# Patient Record
Sex: MALE | URBAN - METROPOLITAN AREA
[De-identification: names, ages, dates, MRNs, and addresses within clinical notes are randomized per-mention and may not be internally consistent; named-entity substitution may affect disease eponyms.]

---

## 2024-03-13 ENCOUNTER — HOSPITAL ENCOUNTER (EMERGENCY)
Facility: HOSPITAL | Age: 24
Discharge: HOME | End: 2024-03-13
Attending: EMERGENCY MEDICINE

## 2024-03-13 VITALS
WEIGHT: 160 LBS | RESPIRATION RATE: 16 BRPM | OXYGEN SATURATION: 100 % | SYSTOLIC BLOOD PRESSURE: 138 MMHG | TEMPERATURE: 98 F | HEIGHT: 70 IN | BODY MASS INDEX: 22.9 KG/M2 | DIASTOLIC BLOOD PRESSURE: 82 MMHG | HEART RATE: 101 BPM

## 2024-03-13 DIAGNOSIS — F41.9 ANXIETY: ICD-10-CM

## 2024-03-13 DIAGNOSIS — F19.10 SUBSTANCE ABUSE: Primary | ICD-10-CM

## 2024-03-13 PROCEDURE — 99283 EMERGENCY DEPT VISIT LOW MDM: CPT

## 2024-03-13 PROCEDURE — 25000003 PHARM REV CODE 250: Performed by: EMERGENCY MEDICINE

## 2024-03-13 RX ORDER — LORAZEPAM 1 MG/1
1 TABLET ORAL
Status: COMPLETED | OUTPATIENT
Start: 2024-03-13 | End: 2024-03-13

## 2024-03-13 RX ADMIN — LORAZEPAM 1 MG: 1 TABLET ORAL at 03:03

## 2024-03-13 NOTE — ED PROVIDER NOTES
SCRIBE #1 NOTE: I, Evonne Sandoval, am scribing for, and in the presence of, Alexandre Parkinson Jr., MD. I have scribed the entire note.       History     Chief Complaint   Patient presents with    Anxiety     Pt reports anxiety after taking 2THC gummies. Denies relief from drinking coffee, beer or milk at home PTA. NO treatment in route.     Review of patient's allergies indicates:  No Known Allergies      History of Present Illness     HPI    3/13/2024, 3:15 AM  History obtained from the patient with [place] interpretation by family member  Additional history obtained from an independent historian: [facility] paramedic      History of Present Illness: Fly Robbins is a 23 y.o. male patient who presents to the Emergency Department for evaluation of anxiety which onset just PTA. Per paramedic: the patient purchased THC gummies from a gas station. He took 2 tonight and started feeling anxious. The patient drank milk, coffee, and beer to attempt relief, but this made his condition worse. Symptoms are constant and moderate in severity. No mitigating or exacerbating factors reported. Patient denies any fever, CP, SOB, and all other sxs at this time. No prior Tx reported. No further complaints or concerns at this time.       Arrival mode: [facility]    PCP: No primary care provider on file.        Past Medical History:  No past medical history on file.    Past Surgical History:  No past surgical history on file.      Family History:  No family history on file.    Social History:  Social History     Tobacco Use    Smoking status: Not on file    Smokeless tobacco: Not on file   Substance and Sexual Activity    Alcohol use: Not on file    Drug use: Not on file    Sexual activity: Not on file        Review of Systems     Review of Systems   Constitutional:  Negative for fever.   HENT:  Negative for sore throat.    Respiratory:  Negative for shortness of breath.    Cardiovascular:  Negative for chest pain.   Gastrointestinal:   "Negative for nausea.   Genitourinary:  Negative for dysuria.   Musculoskeletal:  Negative for back pain.   Skin:  Negative for rash.   Neurological:  Negative for weakness.   Hematological:  Does not bruise/bleed easily.   Psychiatric/Behavioral:  The patient is nervous/anxious.    All other systems reviewed and are negative.     Physical Exam     Initial Vitals [03/13/24 0312]   BP Pulse Resp Temp SpO2   138/82 101 16 98.1 °F (36.7 °C) 100 %      MAP       --          Physical Exam  Nursing Notes and Vital Signs Reviewed.  Constitutional: Patient is in no acute distress. Well-developed and well-nourished.  Head: Atraumatic. Normocephalic.  Eyes:  EOM intact.  No scleral icterus.  ENT: Mucous membranes are moist.  Nares clear   Neck:  Full ROM. No JVD.  Cardiovascular: Regular rate. Regular rhythm No murmurs, rubs, or gallops. Distal pulses are 2+ and symmetric  Pulmonary/Chest: No respiratory distress. Clear to auscultation bilaterally. No wheezing or rales.  Equal chest wall rise bilaterally  Abdominal: Soft and non-distended.  There is no tenderness.  No rebound, guarding, or rigidity. Good bowel sounds.  Genitourinary: No CVA tenderness.  No suprapubic tenderness  Musculoskeletal: Moves all extremities. No obvious deformities.  5 x 5 strength in all extremities   Skin: Warm and dry.  Neurological:  Alert, awake, and appropriate.  Normal speech.  No acute focal neurological deficits are appreciated.  Two through 12 intact bilaterally.  Psychiatric: Normal affect. Good eye contact. Appropriate in content.     ED Course   Procedures  ED Vital Signs:  Vitals:    03/13/24 0312   BP: 138/82   Pulse: 101   Resp: 16   Temp: 98.1 °F (36.7 °C)   TempSrc: Oral   SpO2: 100%   Weight: 72.6 kg (160 lb)   Height: 5' 10" (1.778 m)       Abnormal Lab Results:  Labs Reviewed - No data to display     All Lab Results:  No labs were ordered during this ER visit    Imaging Results:  Imaging Results    None                   The " Emergency Provider reviewed the vital signs and test results, which are outlined above.     ED Discussion     3:18 AM: Reassessed pt at this time. Advised patient to refrain from using THC. He expresses understanding. Discussed with pt all pertinent ED information and results. Discussed pt dx and plan of tx. Gave pt all f/u and return to the ED instructions. All questions and concerns were addressed at this time. Pt expresses understanding of information and instructions, and is comfortable with plan to discharge. Pt is stable for discharge.    I discussed with patient and/or family/caretaker that evaluation in the ED does not suggest any emergent or life threatening medical conditions requiring immediate intervention beyond what was provided in the ED, and I believe patient is safe for discharge.  Regardless, an unremarkable evaluation in the ED does not preclude the development or presence of a serious of life threatening condition. As such, patient was instructed to return immediately for any worsening or change in current symptoms.         Medical Decision Making  Differential diagnosis:  Substance abuse, medication side effect, anxiety, palpitations    Patient with anxiety and palpitations after taking 2 THC gummies.  Feels okay now.  He was not suicidal homicidal.  States he feels high.  Patient was treated with Ativan discharged home.  Stable safe for discharge he was not suicidal homicidal.    Amount and/or Complexity of Data Reviewed  Independent Historian: friend and EMS    Risk  OTC drugs.  Prescription drug management.                ED Medication(s):  Medications   LORazepam tablet 1 mg (has no administration in time range)       New Prescriptions    No medications on file        Follow-up Information       Clinic, [facility].    Contact information:  [address] [place] [id number] [phone]                                 Scribe Attestation:   Scribe #1: I performed the above scribed  service and the documentation accurately describes the services I performed. I attest to the accuracy of the note.     Attending:   Physician Attestation Statement for Scribe #1: I, Alexandre Parkinson Jr., MD, personally performed the services described in this documentation, as scribed by Evonne Sandoval, in my presence, and it is both accurate and complete.           Clinical Impression       ICD-10-CM ICD-9-CM   1. Substance abuse  F19.10 305.90   2. Anxiety  F41.9 300.00       Disposition:   Disposition: Discharged  Condition: Stable         Alexandre Parkinson Jr., MD  03/13/24 0325